# Patient Record
Sex: MALE | Race: AMERICAN INDIAN OR ALASKA NATIVE | HISPANIC OR LATINO | URBAN - METROPOLITAN AREA
[De-identification: names, ages, dates, MRNs, and addresses within clinical notes are randomized per-mention and may not be internally consistent; named-entity substitution may affect disease eponyms.]

---

## 2023-11-07 ENCOUNTER — APPOINTMENT (RX ONLY)
Dept: URBAN - METROPOLITAN AREA CLINIC 52 | Facility: CLINIC | Age: 48
Setting detail: DERMATOLOGY
End: 2023-11-07

## 2023-11-07 DIAGNOSIS — Z41.9 ENCOUNTER FOR PROCEDURE FOR PURPOSES OTHER THAN REMEDYING HEALTH STATE, UNSPECIFIED: ICD-10-CM

## 2023-11-07 PROCEDURE — ? BOTOX (U OR CC)

## 2023-11-07 NOTE — PROCEDURE: BOTOX (U OR CC)
Additional Area 6 Location: lip flip
[place]eter Units: 0
Document As Units Or Cc?: units
Including Pricing Information In The Note: No
Forehead Units/Cc: 10
Glabellar Complex Units: 20
Lot #: [id number]
Price (Use Numbers Only, No Special Characters Or $): 766
Post-Care Instructions: Patient instructed to not lie down for 4 hours and limit physical activity for 24 hours. Patient instructed not to travel by airplane for 48 hours.
Additional Area 1 Location: eyebrow lift
Periorbital Skin Units/Cc: 12
Detail Level: Detailed
Expiration Date (Month Year): 12/2024
Dilution (U/0.1 Cc): 2.5
Additional Area 3 Location: chin
Additional Area 2 Location: bunny lines
Additional Area 4 Location: platysmal bands
Consent: Written consent obtained. Risks include but not limited to lid/brow ptosis, bruising, swelling, diplopia, temporary effect, incomplete chemical denervation.
Additional Area 5 Location: perioral lines

## 2025-01-13 ENCOUNTER — APPOINTMENT (OUTPATIENT)
Dept: URBAN - METROPOLITAN AREA CLINIC 52 | Facility: CLINIC | Age: 50
Setting detail: DERMATOLOGY
End: 2025-01-13

## 2025-01-13 DIAGNOSIS — Z41.9 ENCOUNTER FOR PROCEDURE FOR PURPOSES OTHER THAN REMEDYING HEALTH STATE, UNSPECIFIED: ICD-10-CM

## 2025-01-13 PROCEDURE — ? BOTOX (U OR CC)

## 2025-01-13 NOTE — PROCEDURE: BOTOX (U OR CC)
Additional Area 6 Location: lip flip
[place]eter Units: 0
Document As Units Or Cc?: units
Including Pricing Information In The Note: No
Forehead Units/Cc: 10
Glabellar Complex Units: 20
Lot #: [id number]
Price (Use Numbers Only, No Special Characters Or $): 325
Post-Care Instructions: Patient instructed to not lie down for 4 hours and limit physical activity for 24 hours. Patient instructed not to travel by airplane for 48 hours.
Additional Area 1 Location: eyebrow lift
Periorbital Skin Units/Cc: 12
Detail Level: Detailed
Expiration Date (Month Year): 12/2025
Dilution (U/0.1 Cc): 2.5
Additional Area 3 Location: chin
Additional Area 2 Location: bunny lines
Additional Area 4 Location: platysmal bands
Consent: Written consent obtained. Risks include but not limited to lid/brow ptosis, bruising, swelling, diplopia, temporary effect, incomplete chemical denervation.
Additional Area 5 Location: perioral lines
Additional Area 7 Location: geo lift